# Patient Record
Sex: MALE | Race: WHITE | NOT HISPANIC OR LATINO
[De-identification: names, ages, dates, MRNs, and addresses within clinical notes are randomized per-mention and may not be internally consistent; named-entity substitution may affect disease eponyms.]

---

## 2017-07-12 ENCOUNTER — APPOINTMENT (OUTPATIENT)
Dept: INTERNAL MEDICINE | Facility: CLINIC | Age: 37
End: 2017-07-12

## 2017-07-12 VITALS
WEIGHT: 226 LBS | HEIGHT: 71 IN | HEART RATE: 73 BPM | BODY MASS INDEX: 31.64 KG/M2 | SYSTOLIC BLOOD PRESSURE: 120 MMHG | OXYGEN SATURATION: 98 % | TEMPERATURE: 98.4 F | DIASTOLIC BLOOD PRESSURE: 80 MMHG

## 2017-07-18 LAB
25(OH)D3 SERPL-MCNC: 32.1 NG/ML
ALBUMIN SERPL ELPH-MCNC: 4.9 G/DL
ALP BLD-CCNC: 106 U/L
ALT SERPL-CCNC: 47 U/L
ANION GAP SERPL CALC-SCNC: 21 MMOL/L
AST SERPL-CCNC: 32 U/L
BASOPHILS # BLD AUTO: 0.04 K/UL
BASOPHILS NFR BLD AUTO: 0.4 %
BILIRUB SERPL-MCNC: 0.8 MG/DL
BUN SERPL-MCNC: 16 MG/DL
CALCIUM SERPL-MCNC: 10.4 MG/DL
CHLORIDE SERPL-SCNC: 99 MMOL/L
CHOLEST SERPL-MCNC: 134 MG/DL
CHOLEST/HDLC SERPL: 3 RATIO
CO2 SERPL-SCNC: 18 MMOL/L
CREAT SERPL-MCNC: 1.19 MG/DL
EOSINOPHIL # BLD AUTO: 0.05 K/UL
EOSINOPHIL NFR BLD AUTO: 0.6 %
GLUCOSE SERPL-MCNC: 69 MG/DL
HBA1C MFR BLD HPLC: 4.9 %
HCT VFR BLD CALC: 42.7 %
HCV AB SER QL: NONREACTIVE
HCV S/CO RATIO: 0.07 S/CO
HDLC SERPL-MCNC: 44 MG/DL
HGB BLD-MCNC: 14.9 G/DL
IMM GRANULOCYTES NFR BLD AUTO: 0.1 %
LDLC SERPL CALC-MCNC: 73 MG/DL
LYMPHOCYTES # BLD AUTO: 2.57 K/UL
LYMPHOCYTES NFR BLD AUTO: 28.5 %
MAN DIFF?: NORMAL
MCHC RBC-ENTMCNC: 31 PG
MCHC RBC-ENTMCNC: 34.9 GM/DL
MCV RBC AUTO: 89 FL
MONOCYTES # BLD AUTO: 0.47 K/UL
MONOCYTES NFR BLD AUTO: 5.2 %
NEUTROPHILS # BLD AUTO: 5.88 K/UL
NEUTROPHILS NFR BLD AUTO: 65.2 %
PLATELET # BLD AUTO: 238 K/UL
POTASSIUM SERPL-SCNC: 4.4 MMOL/L
PROT SERPL-MCNC: 8 G/DL
RBC # BLD: 4.8 M/UL
RBC # FLD: 12.5 %
SODIUM SERPL-SCNC: 138 MMOL/L
TRIGL SERPL-MCNC: 85 MG/DL
TSH SERPL-ACNC: 3.93 UIU/ML
WBC # FLD AUTO: 9.02 K/UL

## 2017-10-04 ENCOUNTER — APPOINTMENT (OUTPATIENT)
Dept: ORTHOPEDIC SURGERY | Facility: CLINIC | Age: 37
End: 2017-10-04
Payer: COMMERCIAL

## 2017-10-04 VITALS
HEART RATE: 60 BPM | WEIGHT: 205 LBS | SYSTOLIC BLOOD PRESSURE: 122 MMHG | HEIGHT: 71 IN | BODY MASS INDEX: 28.7 KG/M2 | DIASTOLIC BLOOD PRESSURE: 80 MMHG

## 2017-10-04 PROCEDURE — 99204 OFFICE O/P NEW MOD 45 MIN: CPT

## 2017-10-05 ENCOUNTER — TRANSCRIPTION ENCOUNTER (OUTPATIENT)
Age: 37
End: 2017-10-05

## 2017-11-28 ENCOUNTER — MOBILE ON CALL (OUTPATIENT)
Age: 37
End: 2017-11-28

## 2017-11-29 ENCOUNTER — RX RENEWAL (OUTPATIENT)
Age: 37
End: 2017-11-29

## 2017-12-12 ENCOUNTER — MOBILE ON CALL (OUTPATIENT)
Age: 37
End: 2017-12-12

## 2018-02-07 ENCOUNTER — APPOINTMENT (OUTPATIENT)
Dept: ORTHOPEDIC SURGERY | Facility: CLINIC | Age: 38
End: 2018-02-07
Payer: COMMERCIAL

## 2018-02-07 VITALS — WEIGHT: 190 LBS | BODY MASS INDEX: 26.6 KG/M2 | HEIGHT: 71 IN

## 2018-02-07 PROCEDURE — 99213 OFFICE O/P EST LOW 20 MIN: CPT | Mod: 25

## 2018-02-07 PROCEDURE — 20610 DRAIN/INJ JOINT/BURSA W/O US: CPT | Mod: LT

## 2018-03-07 ENCOUNTER — APPOINTMENT (OUTPATIENT)
Dept: INTERNAL MEDICINE | Facility: CLINIC | Age: 38
End: 2018-03-07

## 2018-03-14 ENCOUNTER — APPOINTMENT (OUTPATIENT)
Dept: INTERNAL MEDICINE | Facility: CLINIC | Age: 38
End: 2018-03-14
Payer: COMMERCIAL

## 2018-03-14 VITALS
OXYGEN SATURATION: 98 % | SYSTOLIC BLOOD PRESSURE: 104 MMHG | BODY MASS INDEX: 27.3 KG/M2 | WEIGHT: 195 LBS | DIASTOLIC BLOOD PRESSURE: 60 MMHG | TEMPERATURE: 98.4 F | HEIGHT: 71 IN | HEART RATE: 81 BPM

## 2018-03-14 PROCEDURE — 99214 OFFICE O/P EST MOD 30 MIN: CPT | Mod: 25

## 2018-03-14 PROCEDURE — 36415 COLL VENOUS BLD VENIPUNCTURE: CPT

## 2018-03-14 RX ORDER — SILDENAFIL 100 MG/1
100 TABLET, FILM COATED ORAL
Qty: 10 | Refills: 2 | Status: ACTIVE | COMMUNITY
Start: 2018-03-14 | End: 1900-01-01

## 2018-03-16 RX ORDER — AMOXICILLIN 500 MG/1
500 CAPSULE ORAL
Qty: 20 | Refills: 0 | Status: COMPLETED | COMMUNITY
Start: 2017-12-06

## 2018-03-16 RX ORDER — OSELTAMIVIR PHOSPHATE 75 MG/1
75 CAPSULE ORAL
Qty: 10 | Refills: 0 | Status: COMPLETED | COMMUNITY
Start: 2018-01-23

## 2018-03-16 RX ORDER — METHYLPHENIDATE HYDROCHLORIDE 20 MG/1
20 TABLET ORAL
Qty: 90 | Refills: 0 | Status: COMPLETED | COMMUNITY
Start: 2017-09-01 | End: 2018-03-16

## 2018-03-19 LAB
ALBUMIN SERPL ELPH-MCNC: 4.8 G/DL
ALP BLD-CCNC: 108 U/L
ALT SERPL-CCNC: 42 U/L
ANION GAP SERPL CALC-SCNC: 17 MMOL/L
AST SERPL-CCNC: 36 U/L
BASOPHILS # BLD AUTO: 0.02 K/UL
BASOPHILS NFR BLD AUTO: 0.2 %
BILIRUB SERPL-MCNC: 0.4 MG/DL
BUN SERPL-MCNC: 16 MG/DL
CALCIUM SERPL-MCNC: 10.1 MG/DL
CHLORIDE SERPL-SCNC: 101 MMOL/L
CO2 SERPL-SCNC: 22 MMOL/L
CREAT SERPL-MCNC: 1.17 MG/DL
EOSINOPHIL # BLD AUTO: 0.06 K/UL
EOSINOPHIL NFR BLD AUTO: 0.6 %
GLUCOSE SERPL-MCNC: 97 MG/DL
HCT VFR BLD CALC: 48.1 %
HGB BLD-MCNC: 15.3 G/DL
HIV1+2 AB SPEC QL IA.RAPID: NONREACTIVE
IMM GRANULOCYTES NFR BLD AUTO: 0.3 %
LYMPHOCYTES # BLD AUTO: 2.97 K/UL
LYMPHOCYTES NFR BLD AUTO: 27.7 %
MAN DIFF?: NORMAL
MCHC RBC-ENTMCNC: 29.6 PG
MCHC RBC-ENTMCNC: 31.8 GM/DL
MCV RBC AUTO: 93 FL
MONOCYTES # BLD AUTO: 0.54 K/UL
MONOCYTES NFR BLD AUTO: 5 %
NEUTROPHILS # BLD AUTO: 7.09 K/UL
NEUTROPHILS NFR BLD AUTO: 66.2 %
PLATELET # BLD AUTO: 267 K/UL
POTASSIUM SERPL-SCNC: 4.5 MMOL/L
PROT SERPL-MCNC: 7.8 G/DL
RBC # BLD: 5.17 M/UL
RBC # FLD: 13.9 %
SODIUM SERPL-SCNC: 140 MMOL/L
TESTOST BND SERPL-MCNC: 17.5 PG/ML
TESTOST SERPL-MCNC: 783.8 NG/DL
TSH SERPL-ACNC: 2.59 UIU/ML
WBC # FLD AUTO: 10.71 K/UL

## 2018-05-11 ENCOUNTER — APPOINTMENT (OUTPATIENT)
Dept: ORTHOPEDIC SURGERY | Facility: CLINIC | Age: 38
End: 2018-05-11
Payer: COMMERCIAL

## 2018-05-11 PROCEDURE — 99213 OFFICE O/P EST LOW 20 MIN: CPT

## 2018-05-30 ENCOUNTER — FORM ENCOUNTER (OUTPATIENT)
Age: 38
End: 2018-05-30

## 2018-05-31 ENCOUNTER — OUTPATIENT (OUTPATIENT)
Dept: OUTPATIENT SERVICES | Facility: HOSPITAL | Age: 38
LOS: 1 days | End: 2018-05-31
Payer: COMMERCIAL

## 2018-05-31 ENCOUNTER — APPOINTMENT (OUTPATIENT)
Dept: MRI IMAGING | Facility: CLINIC | Age: 38
End: 2018-05-31
Payer: COMMERCIAL

## 2018-05-31 DIAGNOSIS — M75.42 IMPINGEMENT SYNDROME OF LEFT SHOULDER: ICD-10-CM

## 2018-05-31 PROCEDURE — 73221 MRI JOINT UPR EXTREM W/O DYE: CPT

## 2018-05-31 PROCEDURE — 73221 MRI JOINT UPR EXTREM W/O DYE: CPT | Mod: 26,LT

## 2018-06-04 ENCOUNTER — RESULT REVIEW (OUTPATIENT)
Age: 38
End: 2018-06-04

## 2018-06-15 ENCOUNTER — APPOINTMENT (OUTPATIENT)
Dept: ORTHOPEDIC SURGERY | Facility: CLINIC | Age: 38
End: 2018-06-15
Payer: COMMERCIAL

## 2018-06-15 PROCEDURE — 99213 OFFICE O/P EST LOW 20 MIN: CPT | Mod: 25

## 2018-06-15 PROCEDURE — 20550 NJX 1 TENDON SHEATH/LIGAMENT: CPT | Mod: LT

## 2018-06-15 PROCEDURE — 76942 ECHO GUIDE FOR BIOPSY: CPT

## 2020-01-24 ENCOUNTER — APPOINTMENT (OUTPATIENT)
Dept: ORTHOPEDIC SURGERY | Facility: CLINIC | Age: 40
End: 2020-01-24
Payer: COMMERCIAL

## 2020-01-24 PROCEDURE — 99213 OFFICE O/P EST LOW 20 MIN: CPT

## 2020-01-24 NOTE — HISTORY OF PRESENT ILLNESS
[de-identified] : Patient is here for right wrist pain that began about 5 days ago. He does not recall any injury occurring but the pain began the morning after doing a dead lift workout. He has used ice and Tylenol to treat this pain. He has noticed improvement. He only has pain with a certain movement. Denies N/T/R/Prior injury.

## 2020-01-24 NOTE — PHYSICAL EXAM
[de-identified] : Constitutional: Well-nourished, well-developed, No acute distress\par Respiratory:  Good respiratory effort, no SOB\par Lymphatic: No regional lymphadenopathy, no lymphedema\par Psychiatric: Pleasant and normal affect, alert and oriented x3\par Skin: Clean dry and intact B/L UE/LE\par Musculoskeletal: normal except where as noted in regional exam\par \par \par Left Wrist:\par APPEARANCE: no marked deformities, no swelling or malalignment\par POSITIVE TENDERNESS: none\par NONTENDER: snuffbox, scapholunate, DRUJ, TFCC, FCU/FCR, ECU/ECRL/ECRB, radial/ulnar styloid, CMC, and MCP joints. \par ROM: full & painless. \par RESISTIVE TESTING: painless resisted wrist flexion/extension, and radial/ulnar deviation. \par SPECIAL TESTS: neg Finkelstein's. neg Phalen's. neg reverse Phalen's. neg Hoff's. neg phoenix test. neg TFCC grind. neg tinel's at the carpal tunnel\par Vasc: 2+ radial pulse\par Neuro: AIN, PIN, Ulnar nerve intact to motor\par Sensation: Intact to light touch throughout\par B/L Shoulders:  No asymmetry, malalignment, or swelling, Full ROM, 5/5 strength in flexion/ext, Abd/Add, IR/ER, Joints stable\par B/L Elbows:  No asymmetry, malalignment, or swelling, Full ROM, 5/5 strength in flex/ext, pronation/supination, Joints stable\par \par Right Wrist:\par APPEARANCE: No swelling, no marked deformities or malalignment\par POSITIVE TENDERNESS: TFCC, ECU\par NONTENDER: snuffbox, scapholunate, DRUJ, FCU/FCR, ECRL/ECRB, radial/ulnar styloid, CMC, and MCP joints.\par ROM: full & painless. \par RESISTIVE TESTING: Mild pain with resisted wrist extension and radial/ulnar deviation, painless resisted wrist flexion. \par SPECIAL TESTS: + TFCC grind, + shuck test, neg Finkelstein's. neg Phalen's. neg reverse Phalen's. neg Hoff's. neg tinel's at the carpal tunnel\par Vasc: 2+ radial pulse\par Neuro: AIN, PIN, Ulnar nerve intact to motor\par Sensation: Intact to light touch throughout\par \par

## 2020-01-24 NOTE — DISCUSSION/SUMMARY
[de-identified] : Discussed findings of today's exam and possible causes of patient's pain.  Educated patient on their most probable diagnosis of right wrist pain due to mild sprain of the TFCC.  Reviewed possible courses of treatment, and we collaboratively decided best course of treatment at this time will include conservative management and watchful waiting. Patient is one week status post injury, no significant instability on clinical exam. Recommend use of oral NSAIDs, activity modification to avoid any exacerbating pain to the ulnar side of the wrist, and recommend use of a wrist support brace when returning to the gym. If patient has persisting pain in a few weeks may consider cortisone injection at that time.  Follow up as needed.  Patient appreciates and agrees with current plan.\par \par This note was generated using dragon medical dictation software.  A reasonable effort has been made for proofreading its contents, but typos may still remain.  If there are any questions or points of clarification needed please notify my office.

## 2020-02-04 ENCOUNTER — TRANSCRIPTION ENCOUNTER (OUTPATIENT)
Age: 40
End: 2020-02-04

## 2020-04-08 ENCOUNTER — APPOINTMENT (OUTPATIENT)
Dept: INTERNAL MEDICINE | Facility: CLINIC | Age: 40
End: 2020-04-08

## 2020-07-16 ENCOUNTER — APPOINTMENT (OUTPATIENT)
Dept: INTERNAL MEDICINE | Facility: CLINIC | Age: 40
End: 2020-07-16

## 2020-11-04 ENCOUNTER — APPOINTMENT (OUTPATIENT)
Dept: INTERNAL MEDICINE | Facility: CLINIC | Age: 40
End: 2020-11-04
Payer: COMMERCIAL

## 2020-11-04 ENCOUNTER — NON-APPOINTMENT (OUTPATIENT)
Age: 40
End: 2020-11-04

## 2020-11-04 VITALS
TEMPERATURE: 98.5 F | BODY MASS INDEX: 29.15 KG/M2 | SYSTOLIC BLOOD PRESSURE: 130 MMHG | WEIGHT: 209 LBS | DIASTOLIC BLOOD PRESSURE: 90 MMHG | OXYGEN SATURATION: 98 % | HEART RATE: 67 BPM

## 2020-11-04 DIAGNOSIS — Z87.438 PERSONAL HISTORY OF OTHER DISEASES OF MALE GENITAL ORGANS: ICD-10-CM

## 2020-11-04 PROCEDURE — G0444 DEPRESSION SCREEN ANNUAL: CPT

## 2020-11-04 PROCEDURE — 99396 PREV VISIT EST AGE 40-64: CPT | Mod: 25

## 2020-11-04 PROCEDURE — 99072 ADDL SUPL MATRL&STAF TM PHE: CPT

## 2020-11-04 PROCEDURE — 93000 ELECTROCARDIOGRAM COMPLETE: CPT | Mod: 59

## 2020-11-04 PROCEDURE — 36415 COLL VENOUS BLD VENIPUNCTURE: CPT

## 2020-11-04 RX ORDER — HYDROCORTISONE ACETATE 25 MG/1
25 SUPPOSITORY RECTAL
Qty: 21 | Refills: 3 | Status: ACTIVE | COMMUNITY
Start: 2020-11-04 | End: 1900-01-01

## 2020-11-05 LAB
25(OH)D3 SERPL-MCNC: 50.4 NG/ML
ALBUMIN SERPL ELPH-MCNC: 5 G/DL
ALP BLD-CCNC: 105 U/L
ALT SERPL-CCNC: 26 U/L
ANION GAP SERPL CALC-SCNC: 14 MMOL/L
AST SERPL-CCNC: 23 U/L
BASOPHILS # BLD AUTO: 0.06 K/UL
BASOPHILS NFR BLD AUTO: 0.7 %
BILIRUB SERPL-MCNC: 0.6 MG/DL
BUN SERPL-MCNC: 21 MG/DL
CALCIUM SERPL-MCNC: 10.3 MG/DL
CHLORIDE SERPL-SCNC: 99 MMOL/L
CHOLEST SERPL-MCNC: 171 MG/DL
CO2 SERPL-SCNC: 25 MMOL/L
CREAT SERPL-MCNC: 1.16 MG/DL
ENDOMYSIUM IGA SER QL: NEGATIVE
ENDOMYSIUM IGA TITR SER: NORMAL
EOSINOPHIL # BLD AUTO: 0.12 K/UL
EOSINOPHIL NFR BLD AUTO: 1.3 %
ESTIMATED AVERAGE GLUCOSE: 91 MG/DL
GLIADIN IGA SER QL: 8.4 UNITS
GLIADIN IGG SER QL: <5 UNITS
GLIADIN PEPTIDE IGA SER-ACNC: NEGATIVE
GLIADIN PEPTIDE IGG SER-ACNC: NEGATIVE
GLUCOSE SERPL-MCNC: 87 MG/DL
HBA1C MFR BLD HPLC: 4.8 %
HCT VFR BLD CALC: 47.2 %
HDLC SERPL-MCNC: 49 MG/DL
HGB BLD-MCNC: 15.7 G/DL
IMM GRANULOCYTES NFR BLD AUTO: 0.3 %
LDLC SERPL CALC-MCNC: 101 MG/DL
LYMPHOCYTES # BLD AUTO: 3.13 K/UL
LYMPHOCYTES NFR BLD AUTO: 34.3 %
MAN DIFF?: NORMAL
MCHC RBC-ENTMCNC: 30.4 PG
MCHC RBC-ENTMCNC: 33.3 GM/DL
MCV RBC AUTO: 91.5 FL
MONOCYTES # BLD AUTO: 0.56 K/UL
MONOCYTES NFR BLD AUTO: 6.1 %
NEUTROPHILS # BLD AUTO: 5.22 K/UL
NEUTROPHILS NFR BLD AUTO: 57.3 %
NONHDLC SERPL-MCNC: 122 MG/DL
PLATELET # BLD AUTO: 243 K/UL
POTASSIUM SERPL-SCNC: 4.5 MMOL/L
PROT SERPL-MCNC: 7.8 G/DL
RBC # BLD: 5.16 M/UL
RBC # FLD: 12.1 %
SODIUM SERPL-SCNC: 138 MMOL/L
TRIGL SERPL-MCNC: 106 MG/DL
TSH SERPL-ACNC: 5.48 UIU/ML
WBC # FLD AUTO: 9.12 K/UL

## 2020-11-09 PROBLEM — Z87.438 HISTORY OF ERECTILE DYSFUNCTION: Status: RESOLVED | Noted: 2018-03-14 | Resolved: 2020-11-09

## 2020-11-09 LAB
SARS-COV-2 IGG SERPL IA-ACNC: <0.1 INDEX
SARS-COV-2 IGG SERPL QL IA: NEGATIVE

## 2020-11-09 NOTE — HISTORY OF PRESENT ILLNESS
[de-identified] : 41 yo male with h/o ADHD but no longer on treatment, former smoker, panic attacks,and basal cell s/p removal. Here for CPE visit. No longer reports erectile dysfunction. No h/o diabetes, CAD, or HTN. On no meds. Does not smoke, drink heavily of use illicit drugs. weight loss over the last year due to lifestyle changes. Notes that his alcohol consumption has increased during COVID and ants his to check his LFTs. Also of note he has had some am diarrhea and colicky pain. Has family history of celiac disease. Using new [employer]oton.

## 2020-11-09 NOTE — ASSESSMENT
[FreeTextEntry1] : 39 yo male with h/o ADHD but no longer on treatment, former smoker, panic attacks,and basal cell s/p removal. Here for CPE visit. No longer reports erectile dysfunction. No h/o diabetes, CAD, or HTN. On no meds. Does not smoke, drink heavily or use illicit drugs. weight loss over the last year due to lifestyle changes. Notes that his alcohol consumption has increased during COVID and ants his to check his LFTs. Also of note he has had some am diarrhea and colicky pain. Has family history of celiac disease. Using new [employer].\par \par ED: resolved.\par hemorrhoid: Anusol HC supp\par loose stools: check electrolytes, screen for celiac disease.\par \par

## 2020-11-09 NOTE — HEALTH RISK ASSESSMENT
[Excellent] : ~his/her~  mood as  excellent [Yes] : Yes [No] : In the past 12 months have you used drugs other than those required for medical reasons? No [No falls in past year] : Patient reported no falls in the past year [0] : 2) Feeling down, depressed, or hopeless: Not at all (0) [None] : None [With Family] : lives with family [Employed] : employed [College] : College [] :  [Sexually Active] : sexually active [Feels Safe at Home] : Feels safe at home [Fully functional (bathing, dressing, toileting, transferring, walking, feeding)] : Fully functional (bathing, dressing, toileting, transferring, walking, feeding) [Fully functional (using the telephone, shopping, preparing meals, housekeeping, doing laundry, using] : Fully functional and needs no help or supervision to perform IADLs (using the telephone, shopping, preparing meals, housekeeping, doing laundry, using transportation, managing medications and managing finances) [Reports normal functional visual acuity (ie: able to read med bottle)] : Reports normal functional visual acuity [] : No [de-identified] : ortho [de-identified] : [place]wn [[id number]] : 0 [Change in mental status noted] : No change in mental status noted [High Risk Behavior] : no high risk behavior [Reports changes in hearing] : Reports no changes in hearing [Reports changes in vision] : Reports no changes in vision [Reports changes in dental health] : Reports no changes in dental health

## 2020-11-09 NOTE — REVIEW OF SYSTEMS
[Recent Change In Weight] : ~T recent weight change [Negative] : Genitourinary [Fatigue] : no fatigue [Discharge] : no discharge [Vision Problems] : no vision problems

## 2021-02-08 ENCOUNTER — APPOINTMENT (OUTPATIENT)
Dept: ORTHOPEDIC SURGERY | Facility: CLINIC | Age: 41
End: 2021-02-08
Payer: COMMERCIAL

## 2021-02-08 PROCEDURE — 99213 OFFICE O/P EST LOW 20 MIN: CPT | Mod: 25

## 2021-02-08 PROCEDURE — 20610 DRAIN/INJ JOINT/BURSA W/O US: CPT | Mod: LT

## 2021-02-08 PROCEDURE — 99072 ADDL SUPL MATRL&STAF TM PHE: CPT

## 2021-02-08 NOTE — HISTORY OF PRESENT ILLNESS
[de-identified] : Patient is here for left shoulder pain follow up. Started few weeks ago. No inciting injury or trauma. Reports feels sore/occasional bouts of pain with some reaching motions. Has hx of rtc tear in the past, treated with cortisone injection back in 2018 which pt responded very well too. No reported weakness, numbness or tingling.

## 2021-02-08 NOTE — DISCUSSION/SUMMARY
[de-identified] : Discussed findings of today's exam and possible causes of patient's pain.  Educated patient on their most probable diagnosis of left shoulder pain, likely primary etiology of subacromial impingement.  Reviewed possible courses of treatment, and we collaboratively decided best course of treatment at this time will include conservative management.  We discussed various treatment options as well as associated risk/benefits/alternatives and patient elected to proceed with subacromial cortisone injection today (see procedure note).  Informed the patient that the numbing medicine in today's injection will last for about 4-6 hours. The steroid that was injected will start to work in 1 to 2 days, peak at 1-2 weeks, and may last up to 1-2 months.  Patient will modify his activity for the next 1-2 weeks, then may proceed with activity as tolerated thereafter.  If patient has persisting pain may consider course of physical therapy at that time.  Patient may continue take oral NSAIDs as needed for pain.  Follow up as needed.  Patient appreciates and agrees with current plan.\par \par This note was generated using dragon medical dictation software.  A reasonable effort has been made for proofreading its contents, but typos may still remain.  If there are any questions or points of clarification needed please notify my office.

## 2021-02-08 NOTE — PHYSICAL EXAM
[de-identified] : Constitutional: Well-nourished, well-developed, No acute distress\par Respiratory:  Good respiratory effort, no SOB\par Lymphatic: No regional lymphadenopathy, no lymphedema\par Psychiatric: Pleasant and normal affect, alert and oriented x3\par Skin: Clean dry and intact B/L UE/LE\par Musculoskeletal: normal except where as noted in regional exam\par \par \par Right Shoulder:\par APPEARANCE: no marked deformities, no swelling or malalignment\par POSITIVE TENDERNESS: none\par NONTENDER: supraspinatus, infraspinatus, teres minor, LH biceps, anterior and posterior capsule, AC joint\par ROM: full & painless, no scapular winging or dyskinesia present\par RESISTIVE TESTING: painless 5/5 resisted flex/ext, empty can/ER/IR, horizontal abd/add \par SPECIAL TESTS: neg Drop Arm, neg Empty Can, neg Ramos/Neers, neg Plata's, neg Speeds, neg Apprehension, neg cross arm adduction, neg apley's scratch test\par Vasc: 2+ radial pulse\par Neuro: AIN, PIN, Ulnar nerve intact to motor, DTRs 2+/4 biceps, triceps, brachioradialis\par Sensation: Intact to light touch throughout\par B/L Elbows:  No asymmetry, malalignment, or swelling, Full ROM, 5/5 strength in flexion/ext, pronation/supination, Joints stable\par B/L Wrist and Hand:  No asymmetry, malalignment, or swelling, Full ROM, 5/5 strength in wrist and long finger flexion/ext, radial/ulnar deviation, Joints stable\par \par Left Shoulder:\par APPEARANCE: no marked deformities, no swelling or malalignment\par POSITIVE TENDERNESS: supraspinatus, long head biceps tendon\par NONTENDER:  infraspinatus, teres minor. biceps. anterior and posterior capsule. AC joint. \par ROM: full with mild painful arc past 60 degrees, no scapular winging or dyskinesia present\par RESISTIVE TESTING: MMT 4+/5 ER, Flexion and Empty can, 5/5 IR. painless 5/5 resisted ext, horizontal abd/add \par SPECIAL TESTS: + Ramos and Neers, mildly + cross arm adduction, + Speeds, neg Plata's, neg Drop Arm, neg Apprehension. neg apley's scratch test\par Vasc: 2+ radial pulse\par Neuro: AIN, PIN, Ulnar nerve intact to motor, DTRs 2+/4 biceps, triceps, brachioradialis\par Sensation: Intact to light touch throughout\par

## 2021-02-08 NOTE — PROCEDURE
[de-identified] : Injection: Left  Shoulder Subacromial Space.\par Indication: Impingement.\par \par A discussion was had with the patient regarding this procedure and all questions were answered. All risks, benefits and alternatives were discussed. These included but were not limited to bleeding, infection, and allergic reaction.  A timeout was done to ensure correct side and pt agreed to the procedure.   Alcohol was used to clean the skin, and betadine was used to sterilize and prep the area in the posterior aspect of the shoulder. Ethyl chloride spray was then used as a topical anesthetic. A 22-gauge 1.5" needle was used to inject 2cc of 1% lidocaine without epinephrine and 1cc of 40mg/ml methylprednisolone into the subacromial space. A sterile bandage was then applied. The patient tolerated the procedure well and there were no complications.\par

## 2021-11-10 ENCOUNTER — APPOINTMENT (OUTPATIENT)
Dept: INTERNAL MEDICINE | Facility: CLINIC | Age: 41
End: 2021-11-10
Payer: COMMERCIAL

## 2021-11-10 VITALS
SYSTOLIC BLOOD PRESSURE: 128 MMHG | OXYGEN SATURATION: 98 % | DIASTOLIC BLOOD PRESSURE: 82 MMHG | TEMPERATURE: 98 F | HEART RATE: 81 BPM

## 2021-11-10 DIAGNOSIS — Z78.9 OTHER SPECIFIED HEALTH STATUS: ICD-10-CM

## 2021-11-10 PROCEDURE — G0444 DEPRESSION SCREEN ANNUAL: CPT | Mod: 59

## 2021-11-10 PROCEDURE — 36415 COLL VENOUS BLD VENIPUNCTURE: CPT

## 2021-11-10 PROCEDURE — G0442 ANNUAL ALCOHOL SCREEN 15 MIN: CPT

## 2021-11-10 PROCEDURE — 99396 PREV VISIT EST AGE 40-64: CPT | Mod: 25

## 2021-11-14 PROBLEM — Z78.9 DOES NOT USE ILLICIT DRUGS: Status: ACTIVE | Noted: 2017-07-18

## 2021-11-14 PROBLEM — Z78.9 CURRENT NON-SMOKER: Status: RESOLVED | Noted: 2017-07-18 | Resolved: 2021-11-14

## 2021-11-14 PROBLEM — Z78.9 EXERCISES OCCASIONALLY: Status: ACTIVE | Noted: 2017-10-04

## 2021-11-14 NOTE — HEALTH RISK ASSESSMENT
[Excellent] : ~his/her~  mood as  excellent [Yes] : Yes [4 or more  times a week (4 pts)] : 4 or more  times a week (4 points) [1 or 2 (0 pts)] : 1 or 2 (0 points) [Less than monthly (1 pt)] : Less than monthly (1 point) [No] : In the past 12 months have you used drugs other than those required for medical reasons? No [No falls in past year] : Patient reported no falls in the past year [0] : 2) Feeling down, depressed, or hopeless: Not at all (0) [PHQ-2 Negative - No further assessment needed] : PHQ-2 Negative - No further assessment needed [No Retinopathy] : No retinopathy [HIV Test offered] : HIV Test offered [Hepatitis C test offered] : Hepatitis C test offered [None] : None [With Family] : lives with family [Employed] : employed [College] : College [] :  [Sexually Active] : sexually active [Feels Safe at Home] : Feels safe at home [Fully functional (bathing, dressing, toileting, transferring, walking, feeding)] : Fully functional (bathing, dressing, toileting, transferring, walking, feeding) [Fully functional (using the telephone, shopping, preparing meals, housekeeping, doing laundry, using] : Fully functional and needs no help or supervision to perform IADLs (using the telephone, shopping, preparing meals, housekeeping, doing laundry, using transportation, managing medications and managing finances) [Reports normal functional visual acuity (ie: able to read med bottle)] : Reports normal functional visual acuity [] : No [de-identified] : Ortho [Audit-CScore] : 5 [de-identified] : Gym; weights; [employer] [de-identified] : high protein, low carb. [facility] [[id number]] : 0 [LowDoseCTScan] : NA [Change in mental status noted] : No change in mental status noted [Language] : denies difficulty with language [Behavior] : denies difficulty with behavior [Learning/Retaining New Information] : denies difficulty learning/retaining new information [Handling Complex Tasks] : denies difficulty handling complex tasks [Reasoning] : denies difficulty with reasoning [Spatial Ability and Orientation] : denies difficulty with spatial ability and orientation [# Of Children ___] : has [unfilled] children [High Risk Behavior] : no high risk behavior [Reports changes in hearing] : Reports no changes in hearing [Reports changes in vision] : Reports no changes in vision [Reports changes in dental health] : Reports no changes in dental health [MammogramDate] : NA [PapSmearDate] : NA [BoneDensityDate] : NA [ColonoscopyDate] : NA [HIVDate] : 3- [HIVComments] : negative [HepatitisCDate] : 7- [HepatitisCComments] : negative

## 2021-11-14 NOTE — HISTORY OF PRESENT ILLNESS
[de-identified] : 42 yo male with h/o ADHD but no longer on treatment, former smoker, panic attacks,and basal cell s/p removal. Here for CPE visit. No longer reports erectile dysfunction. No h/o diabetes, CAD, or HTN. On no meds. Does not smoke, drink heavily or use illicit drugs. weight loss over the last year due to lifestyle changes. Notes that his alcohol consumption had increased during COVID  but now done. LFTs had been normal. Also of note he has had some am diarrhea and colicky pain. Has family history of celiac disease. Using new [employer]. Working out heavily.  c/o groin rash.  Wants testosterone checked.

## 2021-11-14 NOTE — ASSESSMENT
[FreeTextEntry1] : 40 yo male with h/o ADHD but no longer on treatment, former smoker, panic attacks,and basal cell s/p removal. Here for CPE visit. No longer reports erectile dysfunction. No h/o diabetes, CAD, or HTN. On no meds. Does not smoke, drink heavily or use illicit drugs. weight loss over the last year due to lifestyle changes. Notes that his alcohol consumption had increased during COVID  but now done. LFTs had been normal. Also of note he has had some am diarrhea and colicky pain. Has family history of celiac disease. Using new [employer]. Working out heavily.  c/o groin rash.  Wants testosterone checked. \par \par ED: resolved.\par hemorrhoid: Anusol HC supp\par loose stools: electrolytes, screen for celiac disease were negative.  resolved.\par Rash: monilial rash.  treat with miconazole.\par elevated TSH: elevated last year.  repeat with TFTs.  No symptoms of hypothyroidism reported. \par hyper vitamin D: had been on supplements.  check today. \par health care maintenance:  check labs including testosterone.   had flu shot as well as COVID vaccine. \par \par \par

## 2021-11-14 NOTE — REVIEW OF SYSTEMS
[Recent Change In Weight] : ~T recent weight change [Negative] : Heme/Lymph [Fatigue] : no fatigue [Discharge] : no discharge [Vision Problems] : no vision problems

## 2021-11-14 NOTE — PHYSICAL EXAM
[Well Nourished] : well nourished [Well Developed] : well developed [Well-Appearing] : well-appearing [Normal Sclera/Conjunctiva] : normal sclera/conjunctiva [PERRL] : pupils equal round and reactive to light [EOMI] : extraocular movements intact [Normal Outer Ear/Nose] : the outer ears and nose were normal in appearance [Normal Oropharynx] : the oropharynx was normal [No JVD] : no jugular venous distention [No Lymphadenopathy] : no lymphadenopathy [Supple] : supple [Thyroid Normal, No Nodules] : the thyroid was normal and there were no nodules present [No Respiratory Distress] : no respiratory distress  [No Accessory Muscle Use] : no accessory muscle use [Clear to Auscultation] : lungs were clear to auscultation bilaterally [Normal Rate] : normal rate  [Regular Rhythm] : with a regular rhythm [Normal S1, S2] : normal S1 and S2 [No Murmur] : no murmur heard [No Carotid Bruits] : no carotid bruits [No Abdominal Bruit] : a ~M bruit was not heard ~T in the abdomen [No Varicosities] : no varicosities [Pedal Pulses Present] : the pedal pulses are present [No Edema] : there was no peripheral edema [No Palpable Aorta] : no palpable aorta [No Extremity Clubbing/Cyanosis] : no extremity clubbing/cyanosis [Soft] : abdomen soft [Non Tender] : non-tender [Non-distended] : non-distended [No Masses] : no abdominal mass palpated [No HSM] : no HSM [Normal Bowel Sounds] : normal bowel sounds [Normal Posterior Cervical Nodes] : no posterior cervical lymphadenopathy [Normal Anterior Cervical Nodes] : no anterior cervical lymphadenopathy [No CVA Tenderness] : no CVA  tenderness [No Spinal Tenderness] : no spinal tenderness [No Joint Swelling] : no joint swelling [Grossly Normal Strength/Tone] : grossly normal strength/tone [No Rash] : no rash [Coordination Grossly Intact] : coordination grossly intact [No Focal Deficits] : no focal deficits [Normal Gait] : normal gait [Normal Affect] : the affect was normal [Normal Insight/Judgement] : insight and judgment were intact [No Acute Distress] : no acute distress [Fundoscopic Exam Performed] : fundoscopic ~T exam ~C was performed [Normal TMs] : both tympanic membranes were normal

## 2021-11-14 NOTE — COUNSELING
[Behavioral health counseling provided] : Behavioral health counseling provided [Sleep ___ hours/day] : Sleep [unfilled] hours/day [Engage in a relaxing activity] : Engage in a relaxing activity [AUDIT-C Screening administered and reviewed] : AUDIT-C Screening administered and reviewed [None] : None [Good understanding] : Patient has a good understanding of lifestyle changes and steps needed to achieve self management goal

## 2021-11-19 LAB
25(OH)D3 SERPL-MCNC: 35.2 NG/ML
ALBUMIN SERPL ELPH-MCNC: 4.7 G/DL
ALP BLD-CCNC: 103 U/L
ALT SERPL-CCNC: 26 U/L
ANION GAP SERPL CALC-SCNC: 14 MMOL/L
AST SERPL-CCNC: 19 U/L
BASOPHILS # BLD AUTO: 0.05 K/UL
BASOPHILS NFR BLD AUTO: 0.5 %
BILIRUB SERPL-MCNC: 0.5 MG/DL
BUN SERPL-MCNC: 26 MG/DL
CALCIUM SERPL-MCNC: 9.9 MG/DL
CHLORIDE SERPL-SCNC: 102 MMOL/L
CHOLEST SERPL-MCNC: 174 MG/DL
CO2 SERPL-SCNC: 23 MMOL/L
CREAT SERPL-MCNC: 1.36 MG/DL
EOSINOPHIL # BLD AUTO: 0.12 K/UL
EOSINOPHIL NFR BLD AUTO: 1.2 %
ESTIMATED AVERAGE GLUCOSE: 94 MG/DL
GLUCOSE SERPL-MCNC: 90 MG/DL
HBA1C MFR BLD HPLC: 4.9 %
HCT VFR BLD CALC: 45.4 %
HDLC SERPL-MCNC: 38 MG/DL
HGB BLD-MCNC: 15.5 G/DL
IMM GRANULOCYTES NFR BLD AUTO: 0.2 %
LDLC SERPL CALC-MCNC: 94 MG/DL
LYMPHOCYTES # BLD AUTO: 3.53 K/UL
LYMPHOCYTES NFR BLD AUTO: 36.3 %
MAN DIFF?: NORMAL
MCHC RBC-ENTMCNC: 30 PG
MCHC RBC-ENTMCNC: 34.1 GM/DL
MCV RBC AUTO: 87.8 FL
MONOCYTES # BLD AUTO: 0.54 K/UL
MONOCYTES NFR BLD AUTO: 5.6 %
NEUTROPHILS # BLD AUTO: 5.46 K/UL
NEUTROPHILS NFR BLD AUTO: 56.2 %
NONHDLC SERPL-MCNC: 136 MG/DL
PLATELET # BLD AUTO: 241 K/UL
POTASSIUM SERPL-SCNC: 4.2 MMOL/L
PROT SERPL-MCNC: 7.5 G/DL
RBC # BLD: 5.17 M/UL
RBC # FLD: 11.9 %
SODIUM SERPL-SCNC: 139 MMOL/L
T3 SERPL-MCNC: 105 NG/DL
T3FREE SERPL-MCNC: 3.49 PG/ML
T4 FREE SERPL-MCNC: 1.3 NG/DL
T4 SERPL-MCNC: 6.6 UG/DL
TESTOST BND SERPL-MCNC: 3.9 PG/ML
TESTOSTERONE TOTAL S: 134 NG/DL
TRIGL SERPL-MCNC: 212 MG/DL
WBC # FLD AUTO: 9.72 K/UL

## 2021-11-23 RX ORDER — MICONAZOLE NITRATE 20 MG/G
2 CREAM TOPICAL TWICE DAILY
Qty: 1 | Refills: 0 | Status: ACTIVE | COMMUNITY
Start: 2021-11-10 | End: 1900-01-01

## 2022-05-25 ENCOUNTER — APPOINTMENT (OUTPATIENT)
Dept: ORTHOPEDIC SURGERY | Facility: CLINIC | Age: 42
End: 2022-05-25
Payer: COMMERCIAL

## 2022-05-25 ENCOUNTER — NON-APPOINTMENT (OUTPATIENT)
Age: 42
End: 2022-05-25

## 2022-05-25 VITALS
HEIGHT: 71 IN | SYSTOLIC BLOOD PRESSURE: 146 MMHG | WEIGHT: 220 LBS | DIASTOLIC BLOOD PRESSURE: 94 MMHG | OXYGEN SATURATION: 96 % | BODY MASS INDEX: 30.8 KG/M2 | HEART RATE: 70 BPM

## 2022-05-25 DIAGNOSIS — S92.919A UNSPECIFIED FRACTURE OF UNSPECIFIED TOE(S), INITIAL ENCOUNTER FOR CLOSED FRACTURE: ICD-10-CM

## 2022-05-25 PROCEDURE — 99214 OFFICE O/P EST MOD 30 MIN: CPT

## 2022-05-25 PROCEDURE — 73630 X-RAY EXAM OF FOOT: CPT | Mod: LT

## 2022-05-28 PROBLEM — S92.919A TOE FRACTURE: Status: ACTIVE | Noted: 2022-05-28

## 2022-11-04 ENCOUNTER — NON-APPOINTMENT (OUTPATIENT)
Age: 42
End: 2022-11-04

## 2022-11-06 ENCOUNTER — NON-APPOINTMENT (OUTPATIENT)
Age: 42
End: 2022-11-06

## 2022-11-16 ENCOUNTER — APPOINTMENT (OUTPATIENT)
Dept: INTERNAL MEDICINE | Facility: CLINIC | Age: 42
End: 2022-11-16

## 2022-11-16 VITALS
WEIGHT: 234 LBS | DIASTOLIC BLOOD PRESSURE: 92 MMHG | HEIGHT: 71 IN | HEART RATE: 75 BPM | TEMPERATURE: 98.1 F | OXYGEN SATURATION: 98 % | SYSTOLIC BLOOD PRESSURE: 136 MMHG | BODY MASS INDEX: 32.76 KG/M2

## 2022-11-16 DIAGNOSIS — Z87.891 PERSONAL HISTORY OF NICOTINE DEPENDENCE: ICD-10-CM

## 2022-11-16 PROCEDURE — 99396 PREV VISIT EST AGE 40-64: CPT

## 2022-11-17 NOTE — PHYSICAL EXAM
[No Acute Distress] : no acute distress [Well Nourished] : well nourished [Well Developed] : well developed [Well-Appearing] : well-appearing [Normal Sclera/Conjunctiva] : normal sclera/conjunctiva [PERRL] : pupils equal round and reactive to light [EOMI] : extraocular movements intact [Fundoscopic Exam Performed] : fundoscopic ~T exam ~C was performed [Normal Outer Ear/Nose] : the outer ears and nose were normal in appearance [Normal Oropharynx] : the oropharynx was normal [Normal TMs] : both tympanic membranes were normal [No JVD] : no jugular venous distention [No Lymphadenopathy] : no lymphadenopathy [Supple] : supple [Thyroid Normal, No Nodules] : the thyroid was normal and there were no nodules present [No Respiratory Distress] : no respiratory distress  [No Accessory Muscle Use] : no accessory muscle use [Clear to Auscultation] : lungs were clear to auscultation bilaterally [Normal Rate] : normal rate  [Regular Rhythm] : with a regular rhythm [Normal S1, S2] : normal S1 and S2 [No Murmur] : no murmur heard [No Carotid Bruits] : no carotid bruits [No Abdominal Bruit] : a ~M bruit was not heard ~T in the abdomen [No Varicosities] : no varicosities [Pedal Pulses Present] : the pedal pulses are present [No Edema] : there was no peripheral edema [No Palpable Aorta] : no palpable aorta [No Extremity Clubbing/Cyanosis] : no extremity clubbing/cyanosis [Soft] : abdomen soft [Non Tender] : non-tender [Non-distended] : non-distended [No Masses] : no abdominal mass palpated [No HSM] : no HSM [Normal Bowel Sounds] : normal bowel sounds [Normal Posterior Cervical Nodes] : no posterior cervical lymphadenopathy [Normal Anterior Cervical Nodes] : no anterior cervical lymphadenopathy [No CVA Tenderness] : no CVA  tenderness [No Spinal Tenderness] : no spinal tenderness [No Joint Swelling] : no joint swelling [Grossly Normal Strength/Tone] : grossly normal strength/tone [No Rash] : no rash [Coordination Grossly Intact] : coordination grossly intact [No Focal Deficits] : no focal deficits [Normal Gait] : normal gait [Normal Affect] : the affect was normal [Normal Insight/Judgement] : insight and judgment were intact

## 2022-11-21 PROBLEM — Z87.891 FORMER SMOKER, STOPPED SMOKING IN DISTANT PAST: Status: ACTIVE | Noted: 2017-07-18

## 2022-11-21 NOTE — HEALTH RISK ASSESSMENT
[Excellent] : ~his/her~  mood as  excellent [Yes] : Yes [4 or more  times a week (4 pts)] : 4 or more  times a week (4 points) [1 or 2 (0 pts)] : 1 or 2 (0 points) [Less than monthly (1 pt)] : Less than monthly (1 point) [No] : In the past 12 months have you used drugs other than those required for medical reasons? No [No falls in past year] : Patient reported no falls in the past year [0] : 2) Feeling down, depressed, or hopeless: Not at all (0) [PHQ-2 Negative - No further assessment needed] : PHQ-2 Negative - No further assessment needed [No Retinopathy] : No retinopathy [HIV Test offered] : HIV Test offered [Hepatitis C test offered] : Hepatitis C test offered [None] : None [With Family] : lives with family [Employed] : employed [College] : College [] :  [# Of Children ___] : has [unfilled] children [Sexually Active] : sexually active [Feels Safe at Home] : Feels safe at home [Fully functional (bathing, dressing, toileting, transferring, walking, feeding)] : Fully functional (bathing, dressing, toileting, transferring, walking, feeding) [Fully functional (using the telephone, shopping, preparing meals, housekeeping, doing laundry, using] : Fully functional and needs no help or supervision to perform IADLs (using the telephone, shopping, preparing meals, housekeeping, doing laundry, using transportation, managing medications and managing finances) [Reports normal functional visual acuity (ie: able to read med bottle)] : Reports normal functional visual acuity [Former] : Former [5-9] : 5-9 [de-identified] : Ortho [Audit-CScore] : 5 [de-identified] : Gym; weights; [employer] [de-identified] : high protein, low carb. [facility] [[id number]] : 0 [LowDoseCTScan] : NA [Change in mental status noted] : No change in mental status noted [Language] : denies difficulty with language [Behavior] : denies difficulty with behavior [Learning/Retaining New Information] : denies difficulty learning/retaining new information [Handling Complex Tasks] : denies difficulty handling complex tasks [Reasoning] : denies difficulty with reasoning [Spatial Ability and Orientation] : denies difficulty with spatial ability and orientation [High Risk Behavior] : no high risk behavior [Reports changes in hearing] : Reports no changes in hearing [Reports changes in vision] : Reports no changes in vision [Reports changes in dental health] : Reports no changes in dental health [MammogramDate] : NA [PapSmearDate] : NA [BoneDensityDate] : NA [ColonoscopyDate] : NA [HIVDate] : 3- [HIVComments] : negative [HepatitisCComments] : negative [HepatitisCDate] : 7-

## 2022-11-21 NOTE — ASSESSMENT
[FreeTextEntry1] : 41 yo male with h/o ADHD but no longer on treatment, former smoker, panic attacks,and basal cell s/p removal. Here for CPE visit. No longer reports erectile dysfunction. No h/o diabetes, CAD, or HTN. On no meds. Does not smoke, drink heavily or use illicit drugs. Has had weight gain over the last year due to heavy work out regimen. Notes that his alcohol consumption had increased during COVID  but now done. LFTs had been normal. No longer has diarrhea and colicky pain. Has family history of celiac disease. Using [employer]. Working out heavily.\par \par ED: resolved.\par hemorrhoid: Anusol HC supp\par loose stools: electrolytes, screen for celiac disease were negative.  resolved.\par Rash: monilial rash.  treated with miconazole.  resolved\par elevated TSH: elevated last year.  repeat with TFTs.  No symptoms of hypothyroidism reported. \par hyper vitamin D: had been on supplements. \par health care maintenance:  check labs (TSH, Lipids, CMP).   had flu shot as well as COVID vaccines. \par \par \par

## 2022-11-21 NOTE — HISTORY OF PRESENT ILLNESS
[de-identified] : 41 yo male with h/o ADHD but no longer on treatment, former smoker, panic attacks,and basal cell s/p removal. Here for CPE visit. No longer reports erectile dysfunction. No h/o diabetes, CAD, or HTN. On no meds. Does not smoke, drink heavily or use illicit drugs. Has had weight gain over the last year due to heavy work out regimen. Notes that his alcohol consumption had increased during COVID  but now done. LFTs had been normal. No longer has diarrhea and colicky pain. Has family history of celiac disease. Using [employer]. Working out heavily.

## 2022-11-23 LAB
ALBUMIN SERPL ELPH-MCNC: 5 G/DL
ALP BLD-CCNC: 105 U/L
ALT SERPL-CCNC: 32 U/L
ANION GAP SERPL CALC-SCNC: 13 MMOL/L
AST SERPL-CCNC: 27 U/L
BILIRUB SERPL-MCNC: 0.4 MG/DL
BUN SERPL-MCNC: 28 MG/DL
CALCIUM SERPL-MCNC: 9.7 MG/DL
CHLORIDE SERPL-SCNC: 100 MMOL/L
CHOLEST SERPL-MCNC: 159 MG/DL
CO2 SERPL-SCNC: 23 MMOL/L
CREAT SERPL-MCNC: 1.12 MG/DL
EGFR: 84 ML/MIN/1.73M2
GLUCOSE SERPL-MCNC: 90 MG/DL
HDLC SERPL-MCNC: 32 MG/DL
LDLC SERPL CALC-MCNC: 62 MG/DL
NONHDLC SERPL-MCNC: 127 MG/DL
POTASSIUM SERPL-SCNC: 4 MMOL/L
PROT SERPL-MCNC: 8.1 G/DL
SODIUM SERPL-SCNC: 136 MMOL/L
TRIGL SERPL-MCNC: 324 MG/DL
TSH SERPL-ACNC: 3.73 UIU/ML

## 2023-06-26 ENCOUNTER — APPOINTMENT (OUTPATIENT)
Dept: INTERNAL MEDICINE | Facility: CLINIC | Age: 43
End: 2023-06-26
Payer: COMMERCIAL

## 2023-06-26 VITALS
HEIGHT: 71 IN | SYSTOLIC BLOOD PRESSURE: 143 MMHG | OXYGEN SATURATION: 97 % | RESPIRATION RATE: 16 BRPM | HEART RATE: 75 BPM | WEIGHT: 235 LBS | DIASTOLIC BLOOD PRESSURE: 85 MMHG | BODY MASS INDEX: 32.9 KG/M2

## 2023-06-26 DIAGNOSIS — Z87.19 PERSONAL HISTORY OF OTHER DISEASES OF THE DIGESTIVE SYSTEM: ICD-10-CM

## 2023-06-26 DIAGNOSIS — Z78.9 OTHER SPECIFIED HEALTH STATUS: ICD-10-CM

## 2023-06-26 PROCEDURE — 99213 OFFICE O/P EST LOW 20 MIN: CPT

## 2023-06-26 RX ORDER — HYDROCORTISONE ACETATE 25 MG/1
25 SUPPOSITORY RECTAL TWICE DAILY
Qty: 1 | Refills: 1 | Status: ACTIVE | COMMUNITY
Start: 2020-11-05 | End: 1900-01-01

## 2023-06-26 RX ORDER — FLUTICASONE PROPIONATE AND SALMETEROL 100; 50 UG/1; UG/1
100-50 POWDER RESPIRATORY (INHALATION)
Qty: 1 | Refills: 0 | Status: ACTIVE | COMMUNITY
Start: 2023-06-26 | End: 1900-01-01

## 2023-06-26 NOTE — HISTORY OF PRESENT ILLNESS
[Moderate] : moderate [___ Weeks ago] :  [unfilled] weeks ago [Episodic] : episodic  [Cough] : cough [In Morning] : in the morning [Congestion] : no congestion [Sore Throat] : no sore throat [Wheezing] : no wheezing [Chills] : no chills [Anorexia] : no anorexia [Shortness Of Breath] : no shortness of breath [Earache] : no earache [Fatigue] : not fatigue [Headache] : no headache [Fever] : no fever [FreeTextEntry8] : 43 yo male with h/o ADHD but no longer on treatment, former smoker, panic attacks,and basal cell s/p removal. Here for acute visit for chronic cough over the last 3 weeks. No h/o CAD, pulmonary disease, or HTN.  On no meds.  Does not smoke, drink heavily or use illicit drugs.  Cough is not productive, occurs in the morning and is clear later in the morning.  No SOA, wheeze, cp or sinus congestion.  Able to exercise without a problem.  COVID negative at home.

## 2023-06-26 NOTE — ASSESSMENT
[FreeTextEntry1] : 43 yo male with h/o ADHD but no longer on treatment, former smoker, panic attacks,and basal cell s/p removal. Here for acute visit for chronic cough over the last 3 weeks. No h/o CAD, pulmonary disease, or HTN.  On no meds.  Does not smoke, drink heavily or use illicit drugs.  Cough is not productive, occurs in the morning and is clear later in the morning.  No SOA, wheeze, cp or sinus congestion.  Able to exercise without a problem.  COVID negative at home.\par \par No evidence of cardiopulmonary pathology.  Likely upper airway or bronchitic due to allergies. Advair 100/25 started.  \par \par Anusol renewed for hemorrhoids.

## 2023-06-26 NOTE — REVIEW OF SYSTEMS
[Fever] : fever [Cough] : cough [Negative] : Heme/Lymph [Chills] : no chills [Fatigue] : no fatigue [Night Sweats] : no night sweats [Discharge] : no discharge [Vision Problems] : no vision problems [Hearing Loss] : no hearing loss [Postnasal Drip] : no postnasal drip [Nasal Discharge] : no nasal discharge [Sore Throat] : no sore throat [Hoarseness] : no hoarseness [Palpitations] : no palpitations [Shortness Of Breath] : no shortness of breath [Wheezing] : no wheezing [Dyspnea on Exertion] : not dyspnea on exertion

## 2023-06-26 NOTE — PHYSICAL EXAM
[No Acute Distress] : no acute distress [Well Nourished] : well nourished [Well Developed] : well developed [Well-Appearing] : well-appearing [Normal Voice/Communication] : normal voice/communication [Normal Sclera/Conjunctiva] : normal sclera/conjunctiva [Normal Outer Ear/Nose] : the outer ears and nose were normal in appearance [Normal Oropharynx] : the oropharynx was normal [Normal TMs] : both tympanic membranes were normal [No JVD] : no jugular venous distention [No Lymphadenopathy] : no lymphadenopathy [Supple] : supple [No Respiratory Distress] : no respiratory distress  [No Accessory Muscle Use] : no accessory muscle use [Clear to Auscultation] : lungs were clear to auscultation bilaterally [Normal Rate] : normal rate  [Regular Rhythm] : with a regular rhythm [Normal S1, S2] : normal S1 and S2 [No Murmur] : no murmur heard [Pedal Pulses Present] : the pedal pulses are present [No Edema] : there was no peripheral edema [No Extremity Clubbing/Cyanosis] : no extremity clubbing/cyanosis [Soft] : abdomen soft [Non Tender] : non-tender [Non-distended] : non-distended [Normal Posterior Cervical Nodes] : no posterior cervical lymphadenopathy [Normal Anterior Cervical Nodes] : no anterior cervical lymphadenopathy [No Rash] : no rash [No Focal Deficits] : no focal deficits [Normal Affect] : the affect was normal [Normal Insight/Judgement] : insight and judgment were intact

## 2023-12-20 ENCOUNTER — APPOINTMENT (OUTPATIENT)
Dept: INTERNAL MEDICINE | Facility: CLINIC | Age: 43
End: 2023-12-20
Payer: COMMERCIAL

## 2023-12-20 VITALS
BODY MASS INDEX: 34.02 KG/M2 | SYSTOLIC BLOOD PRESSURE: 134 MMHG | HEART RATE: 76 BPM | DIASTOLIC BLOOD PRESSURE: 84 MMHG | RESPIRATION RATE: 16 BRPM | OXYGEN SATURATION: 98 % | WEIGHT: 243 LBS | HEIGHT: 71 IN

## 2023-12-20 DIAGNOSIS — S69.81XA OTHER SPECIFIED INJURIES OF RIGHT WRIST, HAND AND FINGER(S), INITIAL ENCOUNTER: ICD-10-CM

## 2023-12-20 DIAGNOSIS — C44.91 BASAL CELL CARCINOMA OF SKIN, UNSPECIFIED: ICD-10-CM

## 2023-12-20 DIAGNOSIS — R79.89 OTHER SPECIFIED ABNORMAL FINDINGS OF BLOOD CHEMISTRY: ICD-10-CM

## 2023-12-20 DIAGNOSIS — Z78.9 OTHER SPECIFIED HEALTH STATUS: ICD-10-CM

## 2023-12-20 DIAGNOSIS — B37.2 CANDIDIASIS OF SKIN AND NAIL: ICD-10-CM

## 2023-12-20 DIAGNOSIS — Z83.79 FAMILY HISTORY OF OTHER DISEASES OF THE DIGESTIVE SYSTEM: ICD-10-CM

## 2023-12-20 DIAGNOSIS — Z87.898 PERSONAL HISTORY OF OTHER SPECIFIED CONDITIONS: ICD-10-CM

## 2023-12-20 DIAGNOSIS — M25.812 OTHER SPECIFIED JOINT DISORDERS, LEFT SHOULDER: ICD-10-CM

## 2023-12-20 DIAGNOSIS — Z87.891 PERSONAL HISTORY OF NICOTINE DEPENDENCE: ICD-10-CM

## 2023-12-20 DIAGNOSIS — Z00.00 ENCOUNTER FOR GENERAL ADULT MEDICAL EXAMINATION W/OUT ABNORMAL FINDINGS: ICD-10-CM

## 2023-12-20 DIAGNOSIS — M65.812 OTHER SYNOVITIS AND TENOSYNOVITIS, LEFT SHOULDER: ICD-10-CM

## 2023-12-20 DIAGNOSIS — Z80.42 FAMILY HISTORY OF MALIGNANT NEOPLASM OF PROSTATE: ICD-10-CM

## 2023-12-20 PROCEDURE — 99396 PREV VISIT EST AGE 40-64: CPT

## 2023-12-23 PROBLEM — Z80.42 FAMILY HISTORY OF MALIGNANT NEOPLASM OF PROSTATE: Status: ACTIVE | Noted: 2017-10-04

## 2023-12-23 PROBLEM — Z78.9 DOES NOT USE ILLICIT DRUGS: Status: ACTIVE | Noted: 2017-10-04

## 2023-12-23 PROBLEM — C44.91 SKIN CANCER, BASAL CELL: Status: RESOLVED | Noted: 2017-07-18 | Resolved: 2023-12-23

## 2023-12-23 PROBLEM — Z87.898 HISTORY OF PERSISTENT COUGH: Status: RESOLVED | Noted: 2023-06-26 | Resolved: 2023-12-23

## 2023-12-23 PROBLEM — M65.812: Status: ACTIVE | Noted: 2018-06-21

## 2023-12-23 PROBLEM — S69.81XA TFCC (TRIANGULAR FIBROCARTILAGE COMPLEX) INJURY, RIGHT, INITIAL ENCOUNTER: Status: ACTIVE | Noted: 2020-01-24

## 2023-12-23 PROBLEM — Z83.79: Status: ACTIVE | Noted: 2017-07-18

## 2023-12-23 PROBLEM — Z78.9 CONSUMES ALCOHOL OCCASIONALLY: Status: ACTIVE | Noted: 2017-10-04

## 2023-12-23 PROBLEM — M25.812 SHOULDER IMPINGEMENT, LEFT: Status: RESOLVED | Noted: 2017-10-04 | Resolved: 2021-11-14

## 2023-12-23 PROBLEM — B37.2 CANDIDAL INTERTRIGO: Status: RESOLVED | Noted: 2021-11-10 | Resolved: 2023-12-23

## 2023-12-23 PROBLEM — Z87.891 FORMER SMOKER: Status: ACTIVE | Noted: 2017-07-18

## 2023-12-23 PROBLEM — R79.89 DECREASED TESTOSTERONE LEVEL: Status: RESOLVED | Noted: 2021-11-23 | Resolved: 2023-12-23

## 2023-12-23 PROBLEM — Z78.9 CURRENT NON-SMOKER: Status: ACTIVE | Noted: 2017-10-04

## 2023-12-23 NOTE — HISTORY OF PRESENT ILLNESS
[de-identified] : 44 yo male with h/o ADHD but no longer on treatment, former smoker, panic attacks, and basal cell s/p removal. Here for CPE visit. No longer reports erectile dysfunction. No h/o diabetes, CAD, or HTN. On no meds. Does not smoke, drink heavily or use illicit drugs. Has had weight gain over the last year due to heavy workout regimen. Notes that his alcohol consumption had increased during COVID but now done. LFTs had been normal. Has family history of celiac disease. Using [employer]. Working out heavily.

## 2023-12-23 NOTE — REVIEW OF SYSTEMS
[Recent Change In Weight] : ~T recent weight change [Fatigue] : no fatigue [Discharge] : no discharge [Vision Problems] : no vision problems [Cough] : no cough [Negative] : ENT

## 2023-12-23 NOTE — PHYSICAL EXAM
[No Acute Distress] : no acute distress [Well Nourished] : well nourished [Well Developed] : well developed [Well-Appearing] : well-appearing [Normal Sclera/Conjunctiva] : normal sclera/conjunctiva [PERRL] : pupils equal round and reactive to light [EOMI] : extraocular movements intact [Fundoscopic Exam Performed] : fundoscopic ~T exam ~C was performed [Normal Outer Ear/Nose] : the outer ears and nose were normal in appearance [Normal Oropharynx] : the oropharynx was normal [Normal TMs] : both tympanic membranes were normal [No JVD] : no jugular venous distention [No Lymphadenopathy] : no lymphadenopathy [Supple] : supple [Thyroid Normal, No Nodules] : the thyroid was normal and there were no nodules present [No Respiratory Distress] : no respiratory distress  [No Accessory Muscle Use] : no accessory muscle use [Clear to Auscultation] : lungs were clear to auscultation bilaterally [Normal Rate] : normal rate  [Regular Rhythm] : with a regular rhythm [Normal S1, S2] : normal S1 and S2 [No Murmur] : no murmur heard [No Carotid Bruits] : no carotid bruits [No Abdominal Bruit] : a ~M bruit was not heard ~T in the abdomen [No Varicosities] : no varicosities [Pedal Pulses Present] : the pedal pulses are present [No Edema] : there was no peripheral edema [No Palpable Aorta] : no palpable aorta [No Extremity Clubbing/Cyanosis] : no extremity clubbing/cyanosis [Soft] : abdomen soft [Non Tender] : non-tender [Non-distended] : non-distended [No Masses] : no abdominal mass palpated [No HSM] : no HSM [Normal Bowel Sounds] : normal bowel sounds [No CVA Tenderness] : no CVA  tenderness [No Spinal Tenderness] : no spinal tenderness [No Joint Swelling] : no joint swelling [Grossly Normal Strength/Tone] : grossly normal strength/tone [No Rash] : no rash [Coordination Grossly Intact] : coordination grossly intact [No Focal Deficits] : no focal deficits [Normal Gait] : normal gait [Normal Affect] : the affect was normal [Normal Insight/Judgement] : insight and judgment were intact [Normal Voice/Communication] : normal voice/communication [No Axillary Lymphadenopathy] : no axillary lymphadenopathy [Normal Posterior Cervical Nodes] : no posterior cervical lymphadenopathy [Normal Anterior Cervical Nodes] : no anterior cervical lymphadenopathy [Speech Grossly Normal] : speech grossly normal [Alert and Oriented x3] : oriented to person, place, and time

## 2023-12-23 NOTE — COUNSELING
[Behavioral health counseling provided] : Behavioral health counseling provided [Sleep ___ hours/day] : Sleep [unfilled] hours/day [Engage in a relaxing activity] : Engage in a relaxing activity [AUDIT-C Screening administered and reviewed] : AUDIT-C Screening administered and reviewed [None] : None [Good understanding] : Patient has a good understanding of lifestyle changes and steps needed to achieve self management goal [Potential consequences of obesity discussed] : Potential consequences of obesity discussed [Benefits of weight loss discussed] : Benefits of weight loss discussed

## 2023-12-23 NOTE — HEALTH RISK ASSESSMENT
[Excellent] : ~his/her~  mood as  excellent [Yes] : Yes [4 or more  times a week (4 pts)] : 4 or more  times a week (4 points) [1 or 2 (0 pts)] : 1 or 2 (0 points) [Less than monthly (1 pt)] : Less than monthly (1 point) [No] : In the past 12 months have you used drugs other than those required for medical reasons? No [No falls in past year] : Patient reported no falls in the past year [0] : 2) Feeling down, depressed, or hopeless: Not at all (0) [PHQ-2 Negative - No further assessment needed] : PHQ-2 Negative - No further assessment needed [de-identified] : Ortho [No Retinopathy] : No retinopathy [HIV Test offered] : HIV Test offered [Hepatitis C test offered] : Hepatitis C test offered [None] : None [With Family] : lives with family [Employed] : employed [College] : College [] :  [# Of Children ___] : has [unfilled] children [Sexually Active] : sexually active [Feels Safe at Home] : Feels safe at home [Fully functional (bathing, dressing, toileting, transferring, walking, feeding)] : Fully functional (bathing, dressing, toileting, transferring, walking, feeding) [Fully functional (using the telephone, shopping, preparing meals, housekeeping, doing laundry, using] : Fully functional and needs no help or supervision to perform IADLs (using the telephone, shopping, preparing meals, housekeeping, doing laundry, using transportation, managing medications and managing finances) [Reports normal functional visual acuity (ie: able to read med bottle)] : Reports normal functional visual acuity [2 - 3 times a week (3 pts)] : 2 - 3  times a week (3 points) [Little interest or pleasure doing things] : 1) Little interest or pleasure doing things [Feeling down, depressed, or hopeless] : 2) Feeling down, depressed, or hopeless [Audit-CScore] : 4 [de-identified] : Gym; weights; [employer] [de-identified] : high protein, low carb. [facility] [GAE5Eiotg] : 0 [LowDoseCTScan] : NA [Change in mental status noted] : No change in mental status noted [Language] : denies difficulty with language [Behavior] : denies difficulty with behavior [Learning/Retaining New Information] : denies difficulty learning/retaining new information [Handling Complex Tasks] : denies difficulty handling complex tasks [Reasoning] : denies difficulty with reasoning [Spatial Ability and Orientation] : denies difficulty with spatial ability and orientation [High Risk Behavior] : no high risk behavior [Reports changes in hearing] : Reports no changes in hearing [Reports changes in vision] : Reports no changes in vision [Reports changes in dental health] : Reports no changes in dental health [MammogramDate] : NA [BoneDensityDate] : NA [PapSmearDate] : NA [ColonoscopyDate] : NA [HIVDate] : 3- [HIVComments] : negative [HepatitisCDate] : 7- [HepatitisCComments] : negative [Former] : Former [5-9] : 5-9

## 2024-07-15 ENCOUNTER — NON-APPOINTMENT (OUTPATIENT)
Age: 44
End: 2024-07-15

## 2024-07-17 ENCOUNTER — LABORATORY RESULT (OUTPATIENT)
Age: 44
End: 2024-07-17

## 2024-07-17 ENCOUNTER — APPOINTMENT (OUTPATIENT)
Dept: INTERNAL MEDICINE | Facility: CLINIC | Age: 44
End: 2024-07-17

## 2024-07-17 VITALS
BODY MASS INDEX: 33.88 KG/M2 | HEIGHT: 71 IN | WEIGHT: 242 LBS | DIASTOLIC BLOOD PRESSURE: 71 MMHG | SYSTOLIC BLOOD PRESSURE: 106 MMHG | OXYGEN SATURATION: 97 % | TEMPERATURE: 99 F | HEART RATE: 76 BPM

## 2024-07-17 DIAGNOSIS — Z83.79 FAMILY HISTORY OF OTHER DISEASES OF THE DIGESTIVE SYSTEM: ICD-10-CM

## 2024-07-17 DIAGNOSIS — Z78.9 OTHER SPECIFIED HEALTH STATUS: ICD-10-CM

## 2024-07-17 DIAGNOSIS — R79.89 OTHER SPECIFIED ABNORMAL FINDINGS OF BLOOD CHEMISTRY: ICD-10-CM

## 2024-07-17 DIAGNOSIS — Z00.00 ENCOUNTER FOR GENERAL ADULT MEDICAL EXAMINATION W/OUT ABNORMAL FINDINGS: ICD-10-CM

## 2024-07-17 DIAGNOSIS — A69.20 LYME DISEASE, UNSPECIFIED: ICD-10-CM

## 2024-07-17 DIAGNOSIS — R50.9 FEVER, UNSPECIFIED: ICD-10-CM

## 2024-07-17 DIAGNOSIS — A69.23 ARTHRITIS DUE TO LYME DISEASE: ICD-10-CM

## 2024-07-17 DIAGNOSIS — Z87.891 PERSONAL HISTORY OF NICOTINE DEPENDENCE: ICD-10-CM

## 2024-07-17 DIAGNOSIS — Z80.42 FAMILY HISTORY OF MALIGNANT NEOPLASM OF PROSTATE: ICD-10-CM

## 2024-07-17 PROCEDURE — 99214 OFFICE O/P EST MOD 30 MIN: CPT

## 2024-07-19 LAB
ALBUMIN SERPL ELPH-MCNC: 4.6 G/DL
ALP BLD-CCNC: 93 U/L
ALT SERPL-CCNC: 42 U/L
ANION GAP SERPL CALC-SCNC: 13 MMOL/L
AST SERPL-CCNC: 25 U/L
BASOPHILS # BLD AUTO: 0.04 K/UL
BASOPHILS NFR BLD AUTO: 0.8 %
BILIRUB SERPL-MCNC: 0.5 MG/DL
BUN SERPL-MCNC: 16 MG/DL
CALCIUM SERPL-MCNC: 9.2 MG/DL
CHLORIDE SERPL-SCNC: 102 MMOL/L
CO2 SERPL-SCNC: 22 MMOL/L
CREAT SERPL-MCNC: 1.13 MG/DL
EGFR: 82 ML/MIN/1.73M2
EOSINOPHIL # BLD AUTO: 0.76 K/UL
EOSINOPHIL NFR BLD AUTO: 16.1 %
GLUCOSE SERPL-MCNC: 101 MG/DL
HCT VFR BLD CALC: 46.8 %
HGB BLD-MCNC: 15.5 G/DL
LYMPHOCYTES # BLD AUTO: 1.08 K/UL
LYMPHOCYTES NFR BLD AUTO: 22.9 %
MAN DIFF?: NORMAL
MCHC RBC-ENTMCNC: 29.4 PG
MCHC RBC-ENTMCNC: 33.1 GM/DL
MCV RBC AUTO: 88.6 FL
MONOCYTES # BLD AUTO: 0.36 K/UL
MONOCYTES NFR BLD AUTO: 7.6 %
NEUTROPHILS # BLD AUTO: 2.32 K/UL
NEUTROPHILS NFR BLD AUTO: 49.2 %
PLATELET # BLD AUTO: 143 K/UL
POTASSIUM SERPL-SCNC: 3.9 MMOL/L
PROT SERPL-MCNC: 7.3 G/DL
RBC # BLD: 5.28 M/UL
RBC # FLD: 12.4 %
SODIUM SERPL-SCNC: 136 MMOL/L
WBC # FLD AUTO: 4.72 K/UL

## 2024-07-22 PROBLEM — A69.23 LYME ARTHRITIS: Status: ACTIVE | Noted: 2024-07-22

## 2024-07-22 PROBLEM — A69.20 LYME DISEASE: Status: ACTIVE | Noted: 2024-07-22

## 2024-07-22 RX ORDER — DOXYCYCLINE 100 MG/1
100 CAPSULE ORAL
Qty: 28 | Refills: 0 | Status: ACTIVE | COMMUNITY
Start: 2024-07-22 | End: 1900-01-01

## 2024-07-23 DIAGNOSIS — D72.10 EOSINOPHILIA, UNSPECIFIED: ICD-10-CM

## 2024-07-27 LAB
ANION GAP SERPL CALC-SCNC: 13 MMOL/L
BUN SERPL-MCNC: 17 MG/DL
CALCIUM SERPL-MCNC: 8.7 MG/DL
CHLORIDE SERPL-SCNC: 105 MMOL/L
CO2 SERPL-SCNC: 22 MMOL/L
CREAT SERPL-MCNC: 0.98 MG/DL
CRP SERPL-MCNC: 19 MG/L
EGFR: 98 ML/MIN/1.73M2
ERYTHROCYTE [SEDIMENTATION RATE] IN BLOOD BY WESTERGREN METHOD: 26 MM/HR
GLUCOSE SERPL-MCNC: 96 MG/DL
HCT VFR BLD CALC: 42.2 %
HGB BLD-MCNC: 13.9 G/DL
MCHC RBC-ENTMCNC: 28.9 PG
MCHC RBC-ENTMCNC: 32.9 GM/DL
MCV RBC AUTO: 87.7 FL
MSMEAR: NORMAL
PLAT MORPH BLD: NORMAL
PLATELET # BLD AUTO: 169 K/UL
POTASSIUM SERPL-SCNC: 4.5 MMOL/L
RBC # BLD: 4.81 M/UL
RBC # FLD: 12.2 %
RBC BLD AUTO: NORMAL
SODIUM SERPL-SCNC: 141 MMOL/L
TRYPTASE: 3.8 UG/L
VIT B12 SERPL-MCNC: 943 PG/ML
WBC # FLD AUTO: 7.02 K/UL

## 2024-12-25 PROBLEM — F10.90 ALCOHOL USE: Status: ACTIVE | Noted: 2017-07-18

## 2025-05-16 ENCOUNTER — APPOINTMENT (OUTPATIENT)
Dept: INTERNAL MEDICINE | Facility: CLINIC | Age: 45
End: 2025-05-16
Payer: COMMERCIAL

## 2025-05-16 VITALS — SYSTOLIC BLOOD PRESSURE: 118 MMHG | DIASTOLIC BLOOD PRESSURE: 81 MMHG

## 2025-05-16 VITALS
TEMPERATURE: 97.6 F | HEIGHT: 71 IN | BODY MASS INDEX: 35 KG/M2 | DIASTOLIC BLOOD PRESSURE: 85 MMHG | WEIGHT: 250 LBS | HEART RATE: 72 BPM | OXYGEN SATURATION: 96 % | SYSTOLIC BLOOD PRESSURE: 140 MMHG

## 2025-05-16 DIAGNOSIS — G43.909 MIGRAINE, UNSPECIFIED, NOT INTRACTABLE, W/OUT STATUS MIGRAINOSUS: ICD-10-CM

## 2025-05-16 PROCEDURE — 99213 OFFICE O/P EST LOW 20 MIN: CPT

## 2025-05-16 PROCEDURE — 36415 COLL VENOUS BLD VENIPUNCTURE: CPT

## 2025-05-19 LAB
ALBUMIN SERPL ELPH-MCNC: 4.9 G/DL
ALP BLD-CCNC: 108 U/L
ALT SERPL-CCNC: 38 U/L
ANION GAP SERPL CALC-SCNC: 16 MMOL/L
AST SERPL-CCNC: 24 U/L
BASOPHILS # BLD AUTO: 0.05 K/UL
BASOPHILS NFR BLD AUTO: 0.5 %
BILIRUB SERPL-MCNC: 0.3 MG/DL
BUN SERPL-MCNC: 24 MG/DL
CALCIUM SERPL-MCNC: 9.9 MG/DL
CHLORIDE SERPL-SCNC: 102 MMOL/L
CO2 SERPL-SCNC: 21 MMOL/L
CREAT SERPL-MCNC: 1.07 MG/DL
EGFRCR SERPLBLD CKD-EPI 2021: 87 ML/MIN/1.73M2
EOSINOPHIL # BLD AUTO: 0.27 K/UL
EOSINOPHIL NFR BLD AUTO: 2.7 %
ESTIMATED AVERAGE GLUCOSE: 103 MG/DL
GLUCOSE SERPL-MCNC: 89 MG/DL
HBA1C MFR BLD HPLC: 5.2 %
HCT VFR BLD CALC: 44.9 %
HGB BLD-MCNC: 15.4 G/DL
IMM GRANULOCYTES NFR BLD AUTO: 0.2 %
LYMPHOCYTES # BLD AUTO: 3.15 K/UL
LYMPHOCYTES NFR BLD AUTO: 32 %
MAN DIFF?: NORMAL
MCHC RBC-ENTMCNC: 30.7 PG
MCHC RBC-ENTMCNC: 34.3 G/DL
MCV RBC AUTO: 89.6 FL
MONOCYTES # BLD AUTO: 0.61 K/UL
MONOCYTES NFR BLD AUTO: 6.2 %
NEUTROPHILS # BLD AUTO: 5.74 K/UL
NEUTROPHILS NFR BLD AUTO: 58.4 %
PLATELET # BLD AUTO: 229 K/UL
POTASSIUM SERPL-SCNC: 4.2 MMOL/L
PROT SERPL-MCNC: 7.4 G/DL
RBC # BLD: 5.01 M/UL
RBC # FLD: 12.5 %
SODIUM SERPL-SCNC: 139 MMOL/L
TSH SERPL-ACNC: 3.5 UIU/ML
WBC # FLD AUTO: 9.84 K/UL

## 2025-05-20 ENCOUNTER — APPOINTMENT (OUTPATIENT)
Dept: MRI IMAGING | Facility: CLINIC | Age: 45
End: 2025-05-20
Payer: COMMERCIAL

## 2025-05-20 ENCOUNTER — OUTPATIENT (OUTPATIENT)
Dept: OUTPATIENT SERVICES | Facility: HOSPITAL | Age: 45
LOS: 1 days | End: 2025-05-20
Payer: COMMERCIAL

## 2025-05-20 DIAGNOSIS — G43.909 MIGRAINE, UNSPECIFIED, NOT INTRACTABLE, WITHOUT STATUS MIGRAINOSUS: ICD-10-CM

## 2025-05-20 PROCEDURE — 70551 MRI BRAIN STEM W/O DYE: CPT | Mod: 26

## 2025-05-20 PROCEDURE — 70551 MRI BRAIN STEM W/O DYE: CPT

## 2025-05-23 ENCOUNTER — NON-APPOINTMENT (OUTPATIENT)
Age: 45
End: 2025-05-23

## 2025-07-09 ENCOUNTER — APPOINTMENT (OUTPATIENT)
Dept: INTERNAL MEDICINE | Facility: CLINIC | Age: 45
End: 2025-07-09
Payer: COMMERCIAL

## 2025-07-09 VITALS
OXYGEN SATURATION: 97 % | WEIGHT: 246 LBS | SYSTOLIC BLOOD PRESSURE: 126 MMHG | HEIGHT: 71 IN | TEMPERATURE: 98.1 F | DIASTOLIC BLOOD PRESSURE: 79 MMHG | BODY MASS INDEX: 34.44 KG/M2 | HEART RATE: 67 BPM

## 2025-07-09 LAB
ALBUMIN SERPL ELPH-MCNC: 4.9 G/DL
ALP BLD-CCNC: 115 U/L
ALT SERPL-CCNC: 41 U/L
ANION GAP SERPL CALC-SCNC: 15 MMOL/L
AST SERPL-CCNC: 28 U/L
BASOPHILS # BLD AUTO: 0.06 K/UL
BASOPHILS NFR BLD AUTO: 0.6 %
BILIRUB SERPL-MCNC: 0.5 MG/DL
BUN SERPL-MCNC: 23 MG/DL
CALCIUM SERPL-MCNC: 10.2 MG/DL
CHLORIDE SERPL-SCNC: 102 MMOL/L
CHOLEST SERPL-MCNC: 194 MG/DL
CO2 SERPL-SCNC: 20 MMOL/L
CREAT SERPL-MCNC: 1.03 MG/DL
EGFRCR SERPLBLD CKD-EPI 2021: 91 ML/MIN/1.73M2
EOSINOPHIL # BLD AUTO: 0.19 K/UL
EOSINOPHIL NFR BLD AUTO: 1.9 %
GLUCOSE SERPL-MCNC: 91 MG/DL
HCT VFR BLD CALC: 46.9 %
HDLC SERPL-MCNC: 37 MG/DL
HGB BLD-MCNC: 16 G/DL
IMM GRANULOCYTES NFR BLD AUTO: 0.3 %
LDLC SERPL-MCNC: 116 MG/DL
LYMPHOCYTES # BLD AUTO: 3.04 K/UL
LYMPHOCYTES NFR BLD AUTO: 31.1 %
MAN DIFF?: NORMAL
MCHC RBC-ENTMCNC: 30.5 PG
MCHC RBC-ENTMCNC: 34.1 G/DL
MCV RBC AUTO: 89.5 FL
MONOCYTES # BLD AUTO: 0.66 K/UL
MONOCYTES NFR BLD AUTO: 6.8 %
NEUTROPHILS # BLD AUTO: 5.78 K/UL
NEUTROPHILS NFR BLD AUTO: 59.3 %
NONHDLC SERPL-MCNC: 157 MG/DL
PLATELET # BLD AUTO: 231 K/UL
POTASSIUM SERPL-SCNC: 4.1 MMOL/L
PROT SERPL-MCNC: 7.7 G/DL
RBC # BLD: 5.24 M/UL
RBC # FLD: 12.5 %
SODIUM SERPL-SCNC: 138 MMOL/L
TRIGL SERPL-MCNC: 237 MG/DL
WBC # FLD AUTO: 9.76 K/UL

## 2025-07-09 PROCEDURE — 99396 PREV VISIT EST AGE 40-64: CPT

## 2025-07-09 PROCEDURE — 36415 COLL VENOUS BLD VENIPUNCTURE: CPT

## 2025-07-12 PROBLEM — G43.909 MIGRAINE: Status: RESOLVED | Noted: 2025-05-16 | Resolved: 2025-07-12

## 2025-07-12 PROBLEM — S69.81XA TFCC (TRIANGULAR FIBROCARTILAGE COMPLEX) INJURY, RIGHT, INITIAL ENCOUNTER: Status: RESOLVED | Noted: 2020-01-24 | Resolved: 2025-07-12

## 2025-07-12 PROBLEM — Z87.81 HISTORY OF FRACTURE OF PHALANX OF TOE: Status: RESOLVED | Noted: 2022-05-28 | Resolved: 2025-07-12

## 2025-07-12 PROBLEM — A69.23 LYME ARTHRITIS: Status: RESOLVED | Noted: 2024-07-22 | Resolved: 2025-07-12

## 2025-07-12 PROBLEM — R50.9 FEVER AND CHILLS: Status: RESOLVED | Noted: 2024-07-17 | Resolved: 2025-07-12

## 2025-07-12 PROBLEM — M65.912: Status: RESOLVED | Noted: 2018-06-21 | Resolved: 2025-07-12

## 2025-07-12 PROBLEM — Z86.19 HISTORY OF LYME DISEASE: Status: RESOLVED | Noted: 2024-07-22 | Resolved: 2025-07-12

## 2025-08-08 ENCOUNTER — NON-APPOINTMENT (OUTPATIENT)
Age: 45
End: 2025-08-08